# Patient Record
Sex: FEMALE | Race: WHITE | NOT HISPANIC OR LATINO | URBAN - NONMETROPOLITAN AREA
[De-identification: names, ages, dates, MRNs, and addresses within clinical notes are randomized per-mention and may not be internally consistent; named-entity substitution may affect disease eponyms.]

---

## 2020-10-08 NOTE — PATIENT DISCUSSION
Surgery Counseling: I have discussed the option of glasses versus cataract surgery versus following. It was explained that when the patients vision no longer meets their visual needs and a glasses prescription does not improve visual symptoms, the option of cataract surgery is a reasonable next step. It was explained that there is no guarantee that removing the cataract will improve their visual symptoms, however; it is believed that the cataract is contributing to the patient's visual impairment and surgery may improve both the visual and functional status of the patient. The risks, benefits and alternatives of surgery were discussed with the patient. After this discussion, the patient desires to proceed with cataract surgery with implantation of an intraocular lens to improve vision for reading fine print and watching tv.

## 2020-10-22 NOTE — PATIENT DISCUSSION
Surgery  Counseling: I have discussed the option of glasses versus cataract surgery versus following . It was explained that when vision no longer meets the patient's visual needs and a new prescription for glasses is not likely to improve all of the patient's visual symptoms, the option of cataract surgery is a reasonable next step. It was explained that there is no guarantee that removing the cataract will improve their visual symptoms, however; it is believed that the cataract is contributing to the patient's visual impairment and surgery may significantly improve both the visual and functional status of the patient. The risks, benefits and alternatives of surgery were discussed with the patient. After this discussion, the patient desires to proceed with cataract surgery with implantation of an intraocular lens to improve vision for distance and glare at night.

## 2020-10-28 NOTE — PATIENT DISCUSSION
S/P PC IOL, OS -  DOING WELL AND STABLE. CONTINUE DROPS AS DIRECTED. TRANSITION CARE TO REFERRING PHYSICIAN.

## 2022-01-04 ENCOUNTER — IMPORTED ENCOUNTER (OUTPATIENT)
Dept: URBAN - NONMETROPOLITAN AREA CLINIC 1 | Facility: CLINIC | Age: 67
End: 2022-01-04

## 2022-01-04 PROBLEM — H25.813: Noted: 2022-01-04

## 2022-01-04 PROCEDURE — 92015 DETERMINE REFRACTIVE STATE: CPT

## 2022-01-04 PROCEDURE — 92310 CONTACT LENS FITTING OU: CPT

## 2022-01-04 PROCEDURE — 99203 OFFICE O/P NEW LOW 30 MIN: CPT

## 2022-02-22 ENCOUNTER — CONSULTATION/EVALUATION (OUTPATIENT)
Dept: URBAN - NONMETROPOLITAN AREA CLINIC 4 | Facility: CLINIC | Age: 67
End: 2022-02-22

## 2022-02-22 DIAGNOSIS — H25.13: ICD-10-CM

## 2022-02-22 PROCEDURE — 99214 OFFICE O/P EST MOD 30 MIN: CPT

## 2022-02-22 ASSESSMENT — VISUAL ACUITY
OD_PAM: 20/20
OD_CC: 20/200
OS_CC: 20/200
OS_AM: 20/20
OD_SC: 20/400
OD_SC: J1
OS_SC: J1
OS_PH: 20/200
OD_PH: 20/200
OS_SC: 20/400
OS_BAT: 20/80

## 2022-02-22 ASSESSMENT — KERATOMETRY
OS_AXISANGLE_DEGREES: 160
OD_K2POWER_DIOPTERS: 42.50
OD_K1POWER_DIOPTERS: 42.25
OS_K2POWER_DIOPTERS: 42.75
OD_AXISANGLE_DEGREES: 169
OS_K1POWER_DIOPTERS: 42.25

## 2022-02-22 ASSESSMENT — TONOMETRY
OD_IOP_MMHG: 17
OS_IOP_MMHG: 18

## 2022-02-22 NOTE — PATIENT DISCUSSION
(Surgical) Visually Significant (secondary to glare), discussed the risks, benefits, alternatives, and limitations of cataract surgery. The patient stated a full understanding and a desire to proceed with the procedure. The patient will need to return for pre-op appointment with cataract measurements and to have any additional questions answered, and start pre-operative eye drops as directed. Pt elects to proceed with OD first and then OS after. Qualifies for LRI and discussed lens benefits & advised need for reading glasses. Pt elects LenSx OU. post op inflammation anticipated, discussed Dextenza insertion after surgery.

## 2022-03-24 ENCOUNTER — POST-OP (OUTPATIENT)
Dept: URBAN - METROPOLITAN AREA SURGERY 3 | Facility: SURGERY | Age: 67
End: 2022-03-24

## 2022-03-24 DIAGNOSIS — Z96.1: ICD-10-CM

## 2022-03-24 PROCEDURE — 99024 POSTOP FOLLOW-UP VISIT: CPT

## 2022-03-24 ASSESSMENT — VISUAL ACUITY
OS_SC: 20/400
OS_PH: 20/50
OU_SC: 20/60
OD_SC: 20/60

## 2022-03-24 ASSESSMENT — KERATOMETRY
OS_AXISANGLE_DEGREES: 160
OS_K1POWER_DIOPTERS: 42.25
OD_K1POWER_DIOPTERS: 42.25
OD_AXISANGLE_DEGREES: 169
OS_K2POWER_DIOPTERS: 42.75
OD_K2POWER_DIOPTERS: 42.50

## 2022-03-24 ASSESSMENT — TONOMETRY
OD_IOP_MMHG: 18
OS_IOP_MMHG: 17

## 2022-03-31 ENCOUNTER — POST OP/EVAL OF SECOND EYE (OUTPATIENT)
Dept: URBAN - NONMETROPOLITAN AREA CLINIC 4 | Facility: CLINIC | Age: 67
End: 2022-03-31

## 2022-03-31 VITALS
RESPIRATION RATE: 16 BRPM | BODY MASS INDEX: 23.32 KG/M2 | WEIGHT: 140 LBS | HEART RATE: 75 BPM | HEIGHT: 65 IN | DIASTOLIC BLOOD PRESSURE: 82 MMHG | SYSTOLIC BLOOD PRESSURE: 135 MMHG

## 2022-03-31 DIAGNOSIS — H25.12: ICD-10-CM

## 2022-03-31 DIAGNOSIS — Z96.1: ICD-10-CM

## 2022-03-31 PROCEDURE — 99024 POSTOP FOLLOW-UP VISIT: CPT

## 2022-03-31 ASSESSMENT — TONOMETRY
OD_IOP_MMHG: 14
OS_IOP_MMHG: 17

## 2022-03-31 ASSESSMENT — VISUAL ACUITY
OS_CC: 20/50+1
OS_PH: 20/40
OD_SC: 20/25+2
OS_BAT: 20/80
OS_AM: 20/20
OS_SC: 20/400

## 2022-03-31 ASSESSMENT — KERATOMETRY
OD_K1POWER_DIOPTERS: 42.25
OS_AXISANGLE_DEGREES: 160
OD_AXISANGLE_DEGREES: 169
OD_K2POWER_DIOPTERS: 42.50
OS_K1POWER_DIOPTERS: 42.25
OS_K2POWER_DIOPTERS: 42.75

## 2022-04-09 ASSESSMENT — KERATOMETRY
OS_AXISANGLE_DEGREES: 160
OD_AXISANGLE_DEGREES: 169
OS_K2POWER_DIOPTERS: 42.75
OS_K1POWER_DIOPTERS: 42.25
OD_K2POWER_DIOPTERS: 42.50
OD_K1POWER_DIOPTERS: 42.25

## 2022-04-09 ASSESSMENT — TONOMETRY
OD_IOP_MMHG: 17
OS_IOP_MMHG: 19

## 2022-04-09 ASSESSMENT — VISUAL ACUITY
OS_SC: 20/60
OD_PH: 20/40
OD_SC: 20/200
OS_GLARE: 20/200-
OD_GLARE: 20/100-2
OS_PH: 20/40+

## 2022-04-12 ENCOUNTER — POST-OP (OUTPATIENT)
Dept: URBAN - NONMETROPOLITAN AREA CLINIC 4 | Facility: CLINIC | Age: 67
End: 2022-04-12

## 2022-04-12 DIAGNOSIS — Z96.1: ICD-10-CM

## 2022-04-12 PROCEDURE — 99024 POSTOP FOLLOW-UP VISIT: CPT

## 2022-04-12 ASSESSMENT — TONOMETRY
OD_IOP_MMHG: 14
OS_IOP_MMHG: 19

## 2022-04-12 ASSESSMENT — KERATOMETRY
OD_K2POWER_DIOPTERS: 42.50
OS_K2POWER_DIOPTERS: 42.75
OS_AXISANGLE_DEGREES: 160
OD_AXISANGLE_DEGREES: 169
OS_K1POWER_DIOPTERS: 42.25
OD_K1POWER_DIOPTERS: 42.25

## 2022-04-12 ASSESSMENT — VISUAL ACUITY
OS_SC: J2
OU_SC: J2
OD_SC: 20/25+1
OU_SC: 20/25+1
OS_SC: 20/80+1

## 2022-04-20 ENCOUNTER — POST-OP (OUTPATIENT)
Dept: URBAN - NONMETROPOLITAN AREA CLINIC 4 | Facility: CLINIC | Age: 67
End: 2022-04-20

## 2022-04-20 DIAGNOSIS — Z96.1: ICD-10-CM

## 2022-04-20 PROCEDURE — 99024 POSTOP FOLLOW-UP VISIT: CPT

## 2022-04-20 ASSESSMENT — KERATOMETRY
OD_K1POWER_DIOPTERS: 42.25
OS_AXISANGLE_DEGREES: 160
OD_K2POWER_DIOPTERS: 42.50
OS_K1POWER_DIOPTERS: 42.25
OS_K2POWER_DIOPTERS: 42.75
OD_AXISANGLE_DEGREES: 169

## 2022-04-20 ASSESSMENT — VISUAL ACUITY
OU_SC: 20/20
OU_SC: J1+
OD_SC: 20/20
OS_SC: J1+

## 2022-04-20 ASSESSMENT — TONOMETRY
OS_IOP_MMHG: 16
OD_IOP_MMHG: 14

## 2022-09-27 ENCOUNTER — FOLLOW UP (OUTPATIENT)
Dept: URBAN - NONMETROPOLITAN AREA CLINIC 4 | Facility: CLINIC | Age: 67
End: 2022-09-27

## 2022-09-27 DIAGNOSIS — H43.813: ICD-10-CM

## 2022-09-27 DIAGNOSIS — H16.223: ICD-10-CM

## 2022-09-27 PROCEDURE — 92014 COMPRE OPH EXAM EST PT 1/>: CPT

## 2022-09-27 ASSESSMENT — VISUAL ACUITY
OU_SC: J1+
OD_SC: 20/20
OS_SC: J1+
OU_SC: 20/20

## 2022-09-27 ASSESSMENT — KERATOMETRY
OS_K2POWER_DIOPTERS: 42.75
OS_K1POWER_DIOPTERS: 42.25
OD_AXISANGLE_DEGREES: 169
OS_AXISANGLE_DEGREES: 160
OD_K1POWER_DIOPTERS: 42.25
OD_K2POWER_DIOPTERS: 42.50

## 2022-09-27 ASSESSMENT — TONOMETRY
OD_IOP_MMHG: 15
OS_IOP_MMHG: 15

## 2024-11-30 NOTE — PATIENT DISCUSSION
I ordered Breo and Spiriva for him.  Thanks    Patient advised to call if any problems, questions, or concerns.

## 2025-04-29 ENCOUNTER — COMPREHENSIVE EXAM (OUTPATIENT)
Age: 70
End: 2025-04-29

## 2025-04-29 DIAGNOSIS — H43.813: ICD-10-CM

## 2025-04-29 DIAGNOSIS — H16.223: ICD-10-CM

## 2025-04-29 DIAGNOSIS — Z96.1: ICD-10-CM

## 2025-04-29 PROCEDURE — 99214 OFFICE O/P EST MOD 30 MIN: CPT
